# Patient Record
Sex: FEMALE | Race: WHITE | NOT HISPANIC OR LATINO | URBAN - METROPOLITAN AREA
[De-identification: names, ages, dates, MRNs, and addresses within clinical notes are randomized per-mention and may not be internally consistent; named-entity substitution may affect disease eponyms.]

---

## 2020-02-14 ENCOUNTER — EMERGENCY (EMERGENCY)
Facility: HOSPITAL | Age: 65
LOS: 1 days | Discharge: HOME | End: 2020-02-14
Attending: EMERGENCY MEDICINE | Admitting: EMERGENCY MEDICINE
Payer: COMMERCIAL

## 2020-02-14 VITALS
WEIGHT: 179.9 LBS | OXYGEN SATURATION: 99 % | HEART RATE: 60 BPM | RESPIRATION RATE: 18 BRPM | TEMPERATURE: 98 F | DIASTOLIC BLOOD PRESSURE: 96 MMHG | SYSTOLIC BLOOD PRESSURE: 167 MMHG

## 2020-02-14 VITALS
RESPIRATION RATE: 16 BRPM | HEART RATE: 61 BPM | OXYGEN SATURATION: 98 % | SYSTOLIC BLOOD PRESSURE: 132 MMHG | TEMPERATURE: 98 F | DIASTOLIC BLOOD PRESSURE: 82 MMHG

## 2020-02-14 PROCEDURE — 72131 CT LUMBAR SPINE W/O DYE: CPT | Mod: 26

## 2020-02-14 PROCEDURE — 99284 EMERGENCY DEPT VISIT MOD MDM: CPT | Mod: 25

## 2020-02-14 PROCEDURE — 73522 X-RAY EXAM HIPS BI 3-4 VIEWS: CPT | Mod: 26

## 2020-02-14 PROCEDURE — 99284 EMERGENCY DEPT VISIT MOD MDM: CPT

## 2020-02-14 PROCEDURE — 72131 CT LUMBAR SPINE W/O DYE: CPT

## 2020-02-14 PROCEDURE — 72192 CT PELVIS W/O DYE: CPT

## 2020-02-14 PROCEDURE — 72100 X-RAY EXAM L-S SPINE 2/3 VWS: CPT

## 2020-02-14 PROCEDURE — 72100 X-RAY EXAM L-S SPINE 2/3 VWS: CPT | Mod: 26

## 2020-02-14 PROCEDURE — 73522 X-RAY EXAM HIPS BI 3-4 VIEWS: CPT

## 2020-02-14 PROCEDURE — 72192 CT PELVIS W/O DYE: CPT | Mod: 26

## 2020-02-14 RX ORDER — DIAZEPAM 5 MG
5 TABLET ORAL ONCE
Refills: 0 | Status: DISCONTINUED | OUTPATIENT
Start: 2020-02-14 | End: 2020-02-14

## 2020-02-14 RX ORDER — ROSUVASTATIN CALCIUM 5 MG/1
0 TABLET ORAL
Qty: 0 | Refills: 0 | DISCHARGE

## 2020-02-14 RX ORDER — LIDOCAINE 4 G/100G
1 CREAM TOPICAL
Qty: 10 | Refills: 0
Start: 2020-02-14 | End: 2020-02-23

## 2020-02-14 RX ORDER — QUINAPRIL HYDROCHLORIDE AND HYDROCHLOROTHIAZIDE 20; 12.5 MG/1; MG/1
0 TABLET ORAL
Qty: 0 | Refills: 0 | DISCHARGE

## 2020-02-14 RX ORDER — LIDOCAINE 4 G/100G
1 CREAM TOPICAL ONCE
Refills: 0 | Status: COMPLETED | OUTPATIENT
Start: 2020-02-14 | End: 2020-02-14

## 2020-02-14 RX ORDER — NEBIVOLOL HYDROCHLORIDE 5 MG/1
0 TABLET ORAL
Qty: 0 | Refills: 0 | DISCHARGE

## 2020-02-14 RX ORDER — DIAZEPAM 5 MG
1 TABLET ORAL
Qty: 9 | Refills: 0
Start: 2020-02-14 | End: 2020-02-16

## 2020-02-14 RX ORDER — DIMETHYL FUMARATE 240 MG/1
0 CAPSULE ORAL
Qty: 0 | Refills: 0 | DISCHARGE

## 2020-02-14 RX ADMIN — Medication 5 MILLIGRAM(S): at 20:31

## 2020-02-14 RX ADMIN — LIDOCAINE 1 PATCH: 4 CREAM TOPICAL at 19:25

## 2020-02-14 RX ADMIN — Medication 500 MILLIGRAM(S): at 20:15

## 2020-02-14 RX ADMIN — Medication 60 MILLIGRAM(S): at 19:25

## 2020-02-14 RX ADMIN — Medication 500 MILLIGRAM(S): at 19:25

## 2020-02-14 RX ADMIN — Medication 5 MILLIGRAM(S): at 19:25

## 2020-02-14 NOTE — ED ADULT NURSE NOTE - OBJECTIVE STATEMENT
64 year old female presents to the ED complaining of right hip pain. Patient sustained a fall this morning around 0515. Patient reports falling onto her right side. Patient denies hitting her head or loss of consciousness. Patient not on blood thinners. Patient has been taking advil for the pain with some relief. Patient reports the pain has persisted through the day even with medication and is radiating across the back now. Patient denies incontinence of bowel or bladder. Patient denies radiation of pain down the leg. Patient denies numbness/tingling. No wounds sustained. Last dose of advil at 1330. Patient ambulatory today and in the ED on arrival with steady gait.

## 2020-02-14 NOTE — ED PROVIDER NOTE - OBJECTIVE STATEMENT
Pt is a 65 yo female who presents to the ED with a cc of pain s/p fall.  PMhx of Hypertension, MS (multiple sclerosis). Pt is a 65 yo female who presents to the ED with a cc of pain s/p fall.  PMhx of Hypertension, MS (multiple sclerosis).  Pt reports that earlier this morning she was ambulating to the bathroom when she lost her balance and fell to the ground landing on her right hip.  She reports that she has some pain in her hip but was able to stand. Shortly after standing she felt pain/spasm in her left hip.  She reports that she has been ambulating but has had pain ambulating and feel like there is a severe spasm in her back.  She has been using a walker to help her ambulate which is not usual for her.  Denies numbness or tingling in ext.  Denies loss of bowel or bladder control.  Denies striking her head, denies LOC.  Denies CP, SOB, abd pain.  Pt reports that she has had previous lumbar fusion

## 2020-02-14 NOTE — ED PROVIDER NOTE - PATIENT PORTAL LINK FT
You can access the FollowMyHealth Patient Portal offered by [facility] by registering at the following website: [email or web address]. By joining [facility]’s FollowMyHealth portal, you will also be able to view your health information using other applications (apps) compatible with our system.

## 2020-02-14 NOTE — ED PROVIDER NOTE - PHYSICAL EXAMINATION
no midline C/T/L TTP   bilateral lower lumbar paraspinal muscle TTP  no overlying skin changes noted to the back   no miryam TTP to bilateral hips  NVI to UE and LE ext

## 2020-02-14 NOTE — ED PROVIDER NOTE - CARE PROVIDER_API CALL
San[facility] Alcocer)  Orthopaedic Surgery  [address] [place] [address]  Phone: [phone]  Fax: [phone]  Follow Up Time:

## 2020-02-14 NOTE — ED PROVIDER NOTE - NSFOLLOWUPINSTRUCTIONS_ED_ALL_ED_FT
Return to the ED for any new or worsening symptoms  Take your medication as prescribed  Valium per label instructions as needed for pain/spasm do not drive when taking   Prednisone 1 tab daily begin tomorrow as you had your first dose here   Lidoderm patch to affected region daily   Heating pad to affected region on 20 min off 40 min as needed for pain and spasm   Advance activity as tolerated  Call your orthopedist in the morning to schedule follow up

## 2020-02-14 NOTE — ED ADULT NURSE NOTE - CHPI ED NUR SYMPTOMS NEG
no fever/no abrasion/no bleeding/no deformity/no vomiting/no tingling/no numbness/no loss of consciousness/no confusion

## 2020-02-14 NOTE — ED PROVIDER NOTE - CLINICAL SUMMARY MEDICAL DECISION MAKING FREE TEXT BOX
Pt is a 63 yo female who presents to the ED with a cc of pain s/p fall.  PMhx of Hypertension, MS (multiple sclerosis).  Pt reports that earlier this morning she was ambulating to the bathroom when she lost her balance and fell to the ground landing on her right hip.  She reports that she has some pain in her hip but was able to stand. Shortly after standing she felt pain/spasm in her left hip.  She reports that she has been ambulating but has had pain ambulating and feel like there is a severe spasm in her back.  She has been using a walker to help her ambulate which is not usual for her.  Denies numbness or tingling in ext.  Denies loss of bowel or bladder control.  Denies striking her head, denies LOC.  Denies CP, SOB, abd pain.  Pt reports that she has had previous lumbar fusion Pt is a 65 yo female who presents to the ED with a cc of pain s/p fall.  PMhx of Hypertension, MS (multiple sclerosis).  Pt reports that earlier this morning she was ambulating to the bathroom when she lost her balance and fell to the ground landing on her right hip.  She reports that she has some pain in her hip but was able to stand. Shortly after standing she felt pain/spasm in her left hip.  She reports that she has been ambulating but has had pain ambulating and feel like there is a severe spasm in her back.  She has been using a walker to help her ambulate which is not usual for her.  Denies numbness or tingling in ext.  Denies loss of bowel or bladder control.  Denies striking her head, denies LOC.  Denies CP, SOB, abd pain.  Pt reports that she has had previous lumbar fusion.  Concern for MSK injury vs spasm.  NVI.  Will medicate for symptoms and monitor

## 2020-02-14 NOTE — ED PROVIDER NOTE - PROGRESS NOTE DETAILS
pt reports improvement in symptoms sitting comfortably at this time no acute fracture noted on CT imaging.  Will continue treatment for spasm. Pt will follow up with her orthopedist Dr. Harvey.  All questions answered, NVI

## 2021-02-18 NOTE — ED ADULT NURSE NOTE - NS ED NOTE ABUSE RESPONSE YN
Review of Systems Health Update:     GENERAL / CONSTITUTIONAL:  No    Excessive fatigue.  No    Unexplained weight loss or gain.  No    Excessive sweating or night sweats.    EYES:  No    Blurry or double vision.  No    Eye pain.   No    Other visual disturbance.    EARS, NOSE, MOUTH AND THROAT:  No    Loss of hearing or prolonged roaring/ringing in ears.   No    Nasal obstruction or discharge.  No    Hoarseness or voice changes.  No    Difficult or painful swallowing.    CARDIOVASCULAR:  No    Chest pain/discomfort at rest or with exercise.  No    Heart murmur, palpitations, or irregular heart beat.  No    History of heart attack or other heart trouble.  No    Leg cramps with walking.  No    Ankle swelling.   No    Shortness of breath.  Yes    History of high blood pressure.    LUNGS:   No    Coughing up blood  No    Chronic cough.  No    Abnormal chest x-ray.  No    Wheezing.  No    History of positive TB skin test.     IMMUNE SYSTEM/ALLERGIES:  No    Frequent infections.   No    History of asthma/allergies.  No    Frequent nasal congestion.   No    Itchy or watery eyes.   No    History of blood transfusion.     INTESTINAL:  No    Poor appetite.  No    Frequent indigestion or heartburn.  No    Nausea, vomiting, diarrhea, or constipation.  No    Vomiting blood.  No    Rectal bleeding or black tarry stools.  No    Diagnosis of hepatitis.    ENDOCRINE:  No    Heat or cold intolerance.  No    Excessive thirst or urination.  Yes    History of diabetes or thyroid disease.     HEMATOLOGIC:   No    Swollen glands or lymph nodes.  No    History of anemia.   No    Bruise easily.   No    Bleeding tendencies.  No    History of blood clots.    MUSCULOSKELETAL:  No    Swollen, stiff, or painful joints.   No    Neck pain.   No    Back pain.   No    History of gout.     SKIN:  No    Recurrent skin rash.   No    Mole changes in size or color.  No    Abnormal hair growth or loss.    NEUROLOGIC:   No    Frequent or severe  headaches.  No    Loss of balance.  No    Unexplained dizziness.  No    Loss of consciousness.   No    History of head injury.  No    Weakness or recurrent numbness or tingling in hands or feet.  No    Twitching or tremors.   No    Difficulty with speech.     UROLOGIC:   No    Recurrent bladder or kidney infections.   No    Kidney stones.   No    Chronic bladder pain, incontinence, difficulty urinating, or urinary frequency.         Yes

## 2023-11-03 PROBLEM — I10 ESSENTIAL (PRIMARY) HYPERTENSION: Chronic | Status: ACTIVE | Noted: 2020-02-14

## 2023-11-03 PROBLEM — G35 MULTIPLE SCLEROSIS: Chronic | Status: ACTIVE | Noted: 2020-02-14

## 2023-11-28 DIAGNOSIS — N89.8 OTHER SPECIFIED NONINFLAMMATORY DISORDERS OF VAGINA: ICD-10-CM

## 2023-11-28 DIAGNOSIS — Z11.3 ENCOUNTER FOR SCREENING FOR INFECTIONS WITH A PREDOMINANTLY SEXUAL MODE OF TRANSMISSION: ICD-10-CM

## 2023-11-28 DIAGNOSIS — Z13.820 ENCOUNTER FOR SCREENING FOR OSTEOPOROSIS: ICD-10-CM

## 2023-11-29 ENCOUNTER — APPOINTMENT (OUTPATIENT)
Dept: OBGYN | Facility: CLINIC | Age: 68
End: 2023-11-29
Payer: MEDICARE

## 2023-11-29 VITALS
WEIGHT: 170 LBS | HEART RATE: 62 BPM | DIASTOLIC BLOOD PRESSURE: 80 MMHG | BODY MASS INDEX: 29.75 KG/M2 | SYSTOLIC BLOOD PRESSURE: 112 MMHG | RESPIRATION RATE: 16 BRPM | HEIGHT: 63.5 IN | OXYGEN SATURATION: 98 %

## 2023-11-29 DIAGNOSIS — Z13.31 ENCOUNTER FOR SCREENING FOR DEPRESSION: ICD-10-CM

## 2023-11-29 DIAGNOSIS — Z12.39 ENCOUNTER FOR OTHER SCREENING FOR MALIGNANT NEOPLASM OF BREAST: ICD-10-CM

## 2023-11-29 DIAGNOSIS — E78.1 PURE HYPERGLYCERIDEMIA: ICD-10-CM

## 2023-11-29 DIAGNOSIS — Z01.411 ENCOUNTER FOR GYNECOLOGICAL EXAMINATION (GENERAL) (ROUTINE) WITH ABNORMAL FINDINGS: ICD-10-CM

## 2023-11-29 DIAGNOSIS — Z87.39 PERSONAL HISTORY OF OTHER DISEASES OF THE MUSCULOSKELETAL SYSTEM AND CONNECTIVE TISSUE: ICD-10-CM

## 2023-11-29 DIAGNOSIS — Z86.79 PERSONAL HISTORY OF OTHER DISEASES OF THE CIRCULATORY SYSTEM: ICD-10-CM

## 2023-11-29 DIAGNOSIS — Z82.49 FAMILY HISTORY OF ISCHEMIC HEART DISEASE AND OTHER DISEASES OF THE CIRCULATORY SYSTEM: ICD-10-CM

## 2023-11-29 DIAGNOSIS — Z12.4 ENCOUNTER FOR SCREENING FOR MALIGNANT NEOPLASM OF CERVIX: ICD-10-CM

## 2023-11-29 DIAGNOSIS — G35 MULTIPLE SCLEROSIS: ICD-10-CM

## 2023-11-29 PROCEDURE — G0444 DEPRESSION SCREEN ANNUAL: CPT

## 2023-11-29 PROCEDURE — 82270 OCCULT BLOOD FECES: CPT

## 2023-11-29 RX ORDER — ATENOLOL 50 MG/1
50 TABLET ORAL
Refills: 0 | Status: ACTIVE | COMMUNITY

## 2023-11-29 RX ORDER — DIMETHYL FUMARATE 240 MG/1
240 CAPSULE ORAL
Refills: 0 | Status: ACTIVE | COMMUNITY

## 2023-11-29 RX ORDER — ROSUVASTATIN CALCIUM 10 MG/1
10 TABLET, FILM COATED ORAL
Refills: 0 | Status: ACTIVE | COMMUNITY

## 2023-11-29 RX ORDER — LISINOPRIL AND HYDROCHLOROTHIAZIDE TABLETS 20; 12.5 MG/1; MG/1
20-12.5 TABLET ORAL
Refills: 0 | Status: ACTIVE | COMMUNITY

## 2023-11-29 RX ORDER — ALENDRONATE SODIUM 70 MG/1
70 TABLET ORAL
Refills: 0 | Status: ACTIVE | COMMUNITY

## 2023-11-30 LAB
SOURCE AMPLIFICATION: NORMAL
T VAGINALIS RRNA SPEC QL NAA+PROBE: NOT DETECTED

## 2023-12-04 LAB
CYTOLOGY CVX/VAG DOC THIN PREP: ABNORMAL
HPV 16 E6+E7 MRNA CVX QL NAA+PROBE: NOT DETECTED
HPV18+45 E6+E7 MRNA CVX QL NAA+PROBE: NOT DETECTED